# Patient Record
Sex: MALE | Race: WHITE | Employment: OTHER | URBAN - METROPOLITAN AREA
[De-identification: names, ages, dates, MRNs, and addresses within clinical notes are randomized per-mention and may not be internally consistent; named-entity substitution may affect disease eponyms.]

---

## 2024-08-17 ENCOUNTER — HOSPITAL ENCOUNTER (EMERGENCY)
Facility: HOSPITAL | Age: 29
Discharge: NURSING, REHAB OR LONG-TERM CARE | End: 2024-08-17
Attending: EMERGENCY MEDICINE
Payer: COMMERCIAL

## 2024-08-17 VITALS
OXYGEN SATURATION: 96 % | TEMPERATURE: 98 F | RESPIRATION RATE: 18 BRPM | BODY MASS INDEX: 17.71 KG/M2 | DIASTOLIC BLOOD PRESSURE: 83 MMHG | HEIGHT: 77 IN | WEIGHT: 150 LBS | HEART RATE: 85 BPM | SYSTOLIC BLOOD PRESSURE: 139 MMHG

## 2024-08-17 DIAGNOSIS — R45.851 SUICIDAL IDEATION: ICD-10-CM

## 2024-08-17 DIAGNOSIS — F32.A DEPRESSION, UNSPECIFIED DEPRESSION TYPE: Primary | ICD-10-CM

## 2024-08-17 PROBLEM — F32.2 MDD (MAJOR DEPRESSIVE DISORDER), SINGLE EPISODE, SEVERE , NO PSYCHOSIS (HCC): Status: ACTIVE | Noted: 2024-08-17

## 2024-08-17 LAB
ALBUMIN SERPL-MCNC: 4 G/DL (ref 3.2–4.8)
ALBUMIN/GLOB SERPL: 1.3 {RATIO} (ref 1–2)
ALP LIVER SERPL-CCNC: 209 U/L
ALT SERPL-CCNC: 42 U/L
AMPHET UR QL SCN: NEGATIVE
ANION GAP SERPL CALC-SCNC: 10 MMOL/L (ref 0–18)
APAP SERPL-MCNC: <2 UG/ML (ref 10–20)
AST SERPL-CCNC: 78 U/L (ref ?–34)
BASOPHILS # BLD AUTO: 0.03 X10(3) UL (ref 0–0.2)
BASOPHILS NFR BLD AUTO: 0.6 %
BENZODIAZ UR QL SCN: NEGATIVE
BILIRUB SERPL-MCNC: 2.2 MG/DL (ref 0.3–1.2)
BILIRUB UR QL: NEGATIVE
BUN BLD-MCNC: 7 MG/DL (ref 9–23)
BUN/CREAT SERPL: 10.9 (ref 10–20)
CALCIUM BLD-MCNC: 8.8 MG/DL (ref 8.7–10.4)
CHLORIDE SERPL-SCNC: 107 MMOL/L (ref 98–112)
CLARITY UR: CLEAR
CO2 SERPL-SCNC: 23 MMOL/L (ref 21–32)
COCAINE UR QL: NEGATIVE
CREAT BLD-MCNC: 0.64 MG/DL
CREAT UR-SCNC: 51.1 MG/DL
DEPRECATED RDW RBC AUTO: 51.4 FL (ref 35.1–46.3)
EGFRCR SERPLBLD CKD-EPI 2021: 131 ML/MIN/1.73M2 (ref 60–?)
EOSINOPHIL # BLD AUTO: 0.11 X10(3) UL (ref 0–0.7)
EOSINOPHIL NFR BLD AUTO: 2.2 %
ERYTHROCYTE [DISTWIDTH] IN BLOOD BY AUTOMATED COUNT: 15.9 % (ref 11–15)
ETHANOL SERPL-MCNC: 134 MG/DL (ref ?–3)
FENTANYL UR QL SCN: NEGATIVE
GLOBULIN PLAS-MCNC: 3 G/DL (ref 2–3.5)
GLUCOSE BLD-MCNC: 106 MG/DL (ref 70–99)
GLUCOSE UR-MCNC: NORMAL MG/DL
HCT VFR BLD AUTO: 44.2 %
HGB BLD-MCNC: 14.9 G/DL
HGB UR QL STRIP.AUTO: NEGATIVE
IMM GRANULOCYTES # BLD AUTO: 0.01 X10(3) UL (ref 0–1)
IMM GRANULOCYTES NFR BLD: 0.2 %
INR BLD: 1.76 (ref 0.8–1.2)
KETONES UR-MCNC: NEGATIVE MG/DL
LEUKOCYTE ESTERASE UR QL STRIP.AUTO: NEGATIVE
LYMPHOCYTES # BLD AUTO: 1.05 X10(3) UL (ref 1–4)
LYMPHOCYTES NFR BLD AUTO: 21.3 %
MCH RBC QN AUTO: 29.9 PG (ref 26–34)
MCHC RBC AUTO-ENTMCNC: 33.7 G/DL (ref 31–37)
MCV RBC AUTO: 88.8 FL
MDMA UR QL SCN: NEGATIVE
MONOCYTES # BLD AUTO: 0.43 X10(3) UL (ref 0.1–1)
MONOCYTES NFR BLD AUTO: 8.7 %
NEUTROPHILS # BLD AUTO: 3.3 X10 (3) UL (ref 1.5–7.7)
NEUTROPHILS # BLD AUTO: 3.3 X10(3) UL (ref 1.5–7.7)
NEUTROPHILS NFR BLD AUTO: 67 %
NITRITE UR QL STRIP.AUTO: NEGATIVE
OPIATES UR QL SCN: NEGATIVE
OSMOLALITY SERPL CALC.SUM OF ELEC: 288 MOSM/KG (ref 275–295)
OXYCODONE UR QL SCN: NEGATIVE
PH UR: 6.5 [PH] (ref 5–8)
PLATELET # BLD AUTO: 123 10(3)UL (ref 150–450)
PLATELETS.RETICULATED NFR BLD AUTO: 7.8 % (ref 0–7)
POTASSIUM SERPL-SCNC: 3.9 MMOL/L (ref 3.5–5.1)
PROT SERPL-MCNC: 7 G/DL (ref 5.7–8.2)
PROT UR-MCNC: NEGATIVE MG/DL
PROTHROMBIN TIME: 21.6 SECONDS (ref 11.6–14.8)
RBC # BLD AUTO: 4.98 X10(6)UL
SALICYLATES SERPL-MCNC: <3 MG/DL (ref 3–20)
SARS-COV-2 RNA RESP QL NAA+PROBE: NOT DETECTED
SODIUM SERPL-SCNC: 140 MMOL/L (ref 136–145)
SP GR UR STRIP: 1.01 (ref 1–1.03)
UROBILINOGEN UR STRIP-ACNC: NORMAL
WBC # BLD AUTO: 4.9 X10(3) UL (ref 4–11)

## 2024-08-17 PROCEDURE — 80307 DRUG TEST PRSMV CHEM ANLYZR: CPT | Performed by: EMERGENCY MEDICINE

## 2024-08-17 PROCEDURE — 80143 DRUG ASSAY ACETAMINOPHEN: CPT | Performed by: EMERGENCY MEDICINE

## 2024-08-17 PROCEDURE — 81003 URINALYSIS AUTO W/O SCOPE: CPT | Performed by: EMERGENCY MEDICINE

## 2024-08-17 PROCEDURE — 80179 DRUG ASSAY SALICYLATE: CPT | Performed by: EMERGENCY MEDICINE

## 2024-08-17 PROCEDURE — 85610 PROTHROMBIN TIME: CPT | Performed by: EMERGENCY MEDICINE

## 2024-08-17 PROCEDURE — 99285 EMERGENCY DEPT VISIT HI MDM: CPT

## 2024-08-17 PROCEDURE — 80053 COMPREHEN METABOLIC PANEL: CPT | Performed by: EMERGENCY MEDICINE

## 2024-08-17 PROCEDURE — 93005 ELECTROCARDIOGRAM TRACING: CPT

## 2024-08-17 PROCEDURE — 93010 ELECTROCARDIOGRAM REPORT: CPT

## 2024-08-17 PROCEDURE — 82077 ASSAY SPEC XCP UR&BREATH IA: CPT | Performed by: EMERGENCY MEDICINE

## 2024-08-17 PROCEDURE — 85025 COMPLETE CBC W/AUTO DIFF WBC: CPT | Performed by: EMERGENCY MEDICINE

## 2024-08-17 PROCEDURE — 36415 COLL VENOUS BLD VENIPUNCTURE: CPT

## 2024-08-17 RX ORDER — ACETAMINOPHEN 500 MG
1000 TABLET ORAL ONCE
Status: COMPLETED | OUTPATIENT
Start: 2024-08-17 | End: 2024-08-17

## 2024-08-17 RX ORDER — ALPRAZOLAM 0.25 MG
0.25 TABLET ORAL ONCE
Status: COMPLETED | OUTPATIENT
Start: 2024-08-17 | End: 2024-08-17

## 2024-08-17 NOTE — ED QUICK NOTES
[facility] transported patient. Calm and cooperative. Belongings taken home by girlfriend.

## 2024-08-17 NOTE — ED PROVIDER NOTES
Patient Seen in: [facility] Emergency Department      History     Chief Complaint   Patient presents with    Eval-P     Stated Complaint: eval-p    Subjective:   29-year-old male with history of hypertension, Marfan syndrome, aortic valve replacement here with complaints of escalating depression and anxiety.  He tells me he experienced something very mentally traumatic last night which is escalated his symptoms and is now made him suicidal.  He did not get injured.  He said he would either overdose on his heart medication or hang himself.  He has no cough chest pain or shortness of breath but he feels very anxious.  No leg swelling.  He has been taking his medicines.  He tells me he takes care of his ill mother.  No nausea vomiting diarrhea.  Denies alcohol or drugs.  States he has never been on antidepressant and has never been admitted to a mental health facility            Objective:   Past Medical History:    Essential hypertension    History of artificial heart valve              Past Surgical History:   Procedure Laterality Date    Abdominal surgery                  Social History     Socioeconomic History    Marital status: Single   Tobacco Use    Smoking status: Never    Smokeless tobacco: Never   Vaping Use    Vaping status: Never Used   Substance and Sexual Activity    Alcohol use: Yes    Drug use: Yes     Types: Cannabis              Review of Systems    Positive for stated Chief Complaint: Eval-P    Other systems are as noted in HPI.  Constitutional and vital signs reviewed.      All other systems reviewed and negative except as noted above.    Physical Exam     ED Triage Vitals [08/17/24 1203]   BP (!) 147/98   Pulse 95   Resp 18   Temp 98 °F (36.7 °C)   Temp src Temporal   SpO2 96 %   O2 Device None (Room air)       Current Vitals:   Vital Signs  BP: (!) 147/98  Pulse: 95  Resp: 18  Temp: 98 °F (36.7 °C)  Temp src: Temporal    Oxygen Therapy  SpO2: 96 %  O2 Device: None (Room  air)            Physical Exam  Constitutional:       Comments: Tearful while talking at times.   HENT:      Head: Normocephalic.      Right Ear: External ear normal.      Left Ear: External ear normal.      Nose: Nose normal.      Mouth/Throat:      Mouth: Mucous membranes are moist.   Eyes:      Extraocular Movements: Extraocular movements intact.      Pupils: Pupils are equal, round, and reactive to light.   Cardiovascular:      Rate and Rhythm: Normal rate and regular rhythm.      Pulses: Normal pulses.      Heart sounds: Murmur heard.   Pulmonary:      Effort: Pulmonary effort is normal.      Breath sounds: Normal breath sounds.   Abdominal:      Palpations: Abdomen is soft.      Tenderness: There is no abdominal tenderness.   Musculoskeletal:         General: Normal range of motion.   Skin:     General: Skin is warm.      Capillary Refill: Capillary refill takes less than 2 seconds.   Neurological:      General: No focal deficit present.      Mental Status: He is alert.               ED Course     Labs Reviewed   COMP METABOLIC PANEL (14) - Abnormal; Notable for the following components:       Result Value    Glucose 106 (*)     BUN 7 (*)     Creatinine 0.64 (*)     AST 78 (*)     Alkaline Phosphatase 209 (*)     Bilirubin, Total 2.2 (*)     All other components within normal limits   CBC WITH DIFFERENTIAL WITH PLATELET - Abnormal; Notable for the following components:    RDW-SD 51.4 (*)     RDW 15.9 (*)     .0 (*)     Immature Platelet Fraction 7.8 (*)     All other components within normal limits   DRUG SCREEN 8 W/OUT CONFIRMATION, URINE - Abnormal; Notable for the following components:    Cannabinoid Urine Presumed Positive (*)     All other components within normal limits   ACETAMINOPHEN (TYLENOL), S - Abnormal; Notable for the following components:    Acetaminophen <2.0 (*)     All other components within normal limits   SALICYLATE, SERUM - Abnormal; Notable for the following components:     Salicylate <3.0 (*)     All other components within normal limits   ETHYL ALCOHOL - Abnormal; Notable for the following components:    Ethyl Alcohol 134 (*)     All other components within normal limits   PROTHROMBIN TIME (PT) - Abnormal; Notable for the following components:    PT 21.6 (*)     INR 1.76 (*)     All other components within normal limits   SARS-COV-2 BY PCR (GENEXPERT) - Normal   URINALYSIS WITH CULTURE REFLEX   RAINBOW DRAW BLUE          ED Course as of 08/17/24 1614  ------------------------------------------------------------  Time: 08/17 1336  Comment: Significantly interpreted by me.  There is small amount of alcohol in the system of 1.34.  INR 1.7.  CBC normal, CMP shows slight AST elevation alk phos elevation of the bilirubin at 2.2.  Patient may be chronic drinker.  COVID-negative.  ------------------------------------------------------------  Time: 08/17 1400  Comment: Cardiology note from  care everywhere said \"reduced INR goal to 1.5-2.0 with On-X aortic valve\"\"  ------------------------------------------------------------  Time: 08/17 1400  Comment: Patient's INR does seem to be in his recommended normal range.  ------------------------------------------------------------  Time: 08/17 1458  Comment: CBC shows thrombocytopenia.  COVID not detected.  Patient is medically cleared.  Still needs drug screen.  Seen remotely by liaison.  Xanax was given.  Will need admission.  ------------------------------------------------------------  Time: 08/17 1651  Comment: Urine tox shows cannabis.  Patient is medically cleared from my standpoint for psychiatric admission.  UA normal also              MDM      No results found.                                     Medical Decision Making  Patient with Marfan's syndrome now with increasing depression and now suicidal thoughts.  He does have a couple ideas of how he would harm himself.  Patient could be having depression, other mood disorders, adjustment  reaction and many other possibilities.  Will do workup with lab work and then speak to liaison for probable placement.    Amount and/or Complexity of Data Reviewed  Labs: ordered. Decision-making details documented in ED Course.  Discussion of management or test interpretation with external provider(s): Please see ED course.  Patient evaluated by liaison.  Certificate filled out.  Discussed with cardiology.  Patient clear for psychiatric admission.        Disposition and Plan     Clinical Impression:  1. Depression, unspecified depression type    2. Suicidal ideation         Disposition:  Psychiatric transfer  8/17/2024  4:14 pm    Follow-up:  No follow-up provider specified.        Medications Prescribed:  There are no discharge medications for this patient.

## 2024-08-17 NOTE — ED INITIAL ASSESSMENT (HPI)
Pt arrived to ED \"not doing to well mentally.\" Pt reports thughts of self harm, pt reports he has a plan with intent of acting out plan. Pt reports suicide attempt within the last year.
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
## 2024-08-17 NOTE — ED QUICK NOTES
Pt in room with his gf. Pt states he has had a lot going on in his life so he cannot pinpoint one thing that is making him feel anxious and overwhelmed. He said he recently had something happen that put him over the edge. He had a previous suicide attempt about a month and a half ago. When asked, he says he has a plan now to take pills which he has access to at home. Pt said he came in because he was scaring his gf and stated \"I need this.\" Pt tearful at times when talking. Protocol explained to pt.

## 2024-08-17 NOTE — ED NOTES
Inpatient admission was discussed with patient.  Patient requested possible admission to a hospital near where he lives.

## 2024-08-17 NOTE — ED NOTES
[facility] met with pt at bedside.  Pt's significant other, Amber Ferris, present at bedside with pt's consent.  [facility] discussed placement with pt.  Pt agreeable to referral to [facility].      [facility] - Pt accepted at [facility] by Dr. Mabry.      [facility] met with pt and significant other at bedside to review acceptance at [facility].  [facility] reviewed Rights of Individuals Receiving Mental Health and Developmental Disability Services with pt.  Pt signed Rights for [facility] and [facility].  [facility] discussed voluntary versus involuntary admission with pt.  Pt agreeable to voluntary admission.  Pt signed Application for Voluntary Admission for [facility].  [facility] reviewed Rights of Voluntary Admittee with pt.  Pt provided with copies of Petition, Rights ([facility] and [facility]), and Voluntary ([facility] and [facility]).  Pt's significant other provided with [facility] packing list and hospital contact information.      Petition, Certificate, Rights, and Voluntary scanned into pt chart.    Nieves Mcmillan [facility]  [facility]

## 2024-08-19 LAB
ATRIAL RATE: 73 BPM
P AXIS: 63 DEGREES
P-R INTERVAL: 180 MS
Q-T INTERVAL: 464 MS
QRS DURATION: 154 MS
QTC CALCULATION (BEZET): 511 MS
R AXIS: -17 DEGREES
T AXIS: 21 DEGREES
VENTRICULAR RATE: 73 BPM